# Patient Record
Sex: MALE | Race: BLACK OR AFRICAN AMERICAN
[De-identification: names, ages, dates, MRNs, and addresses within clinical notes are randomized per-mention and may not be internally consistent; named-entity substitution may affect disease eponyms.]

---

## 2017-01-04 ENCOUNTER — HOSPITAL ENCOUNTER (EMERGENCY)
Dept: HOSPITAL 62 - ER | Age: 37
Discharge: HOME | End: 2017-01-04
Payer: SELF-PAY

## 2017-01-04 VITALS — SYSTOLIC BLOOD PRESSURE: 121 MMHG | DIASTOLIC BLOOD PRESSURE: 65 MMHG

## 2017-01-04 DIAGNOSIS — I25.2: ICD-10-CM

## 2017-01-04 DIAGNOSIS — J01.90: Primary | ICD-10-CM

## 2017-01-04 DIAGNOSIS — G43.909: ICD-10-CM

## 2017-01-04 DIAGNOSIS — Z87.891: ICD-10-CM

## 2017-01-04 PROCEDURE — 99283 EMERGENCY DEPT VISIT LOW MDM: CPT

## 2017-01-04 NOTE — ER DOCUMENT REPORT
ED General





- General


Chief Complaint: Headache


Stated Complaint: HEADACHE,BUMP ON RIGHT CORNEA


Time seen by provider: 15:06


Mode of Arrival: Ambulatory


Information source: Patient


Notes: 


This is a 36-year-old man with a history of migraines who presents to the 

emergency room with sinus congestion, postnasal drip, subjective chills, and a 

right-sided headache consistent with his migraines.  Patient states his been 

using Benadryl and intake and Norco without significant relief.


TRAVEL OUTSIDE OF THE [place] IN LAST 30 DAYS: No





- HPI


Onset: Last week


Onset/Duration: Gradual


Quality of pain: Dull


Severity: Moderate


Pain Level: 2


Associated symptoms: Nonproductive cough, Sinus pain/drainage.  denies: Chills, 

Fever, Nausea, Vomiting, Shortness of breath


Exacerbated by: Denies


Relieved by: Denies


Similar symptoms previously: Yes


Recently seen / treated by doctor: Yes





- Related Data


Allergies/Adverse Reactions: 


 





tramadol [Tramadol] Allergy (Verified 01/04/17 12:34)


 











Past Medical History





- General


Information source: Patient





- Social History


Smoking Status: Former Smoker


Chew tobacco use (# tins/day): No


Frequency of alcohol use: None


Drug Abuse: None


Lives with: Family


Family History: Reviewed & Not Pertinent


Patient has suicidal ideation: No


Patient has homicidal ideation: No





- Past Medical History


Cardiac Medical History: Reports: Hx Heart Attack - mini- PT REPORTS HE WAS 

TOLD HE HAD A HEART ATTACK IN 06-07 PT BROUGHT TO [facility].


Neurological Medical History: Reports: Hx Migraine


Endocrine Medical History: Denies: Hx Diabetes Mellitus Type 1, Hx Diabetes 

Mellitus Type 2


Musculoskeltal Medical History: Reports Hx Arthritis


Surgical Hx: Negative





- Immunizations


Immunizations up to date: No


Hx Diphtheria, Pertussis, Tetanus Vaccination: Yes - Tetanus is up to date





Review of Systems





- Review of Systems


Constitutional: denies: Chills, Fever


EENT: See HPI


Cardiovascular: No symptoms reported


Respiratory: No symptoms reported


Gastrointestinal: No symptoms reported


Genitourinary: No symptoms reported


Male Genitourinary: No symptoms reported


Musculoskeletal: No symptoms reported


Skin: No symptoms reported


Hematologic/Lymphatic: No symptoms reported


Neurological/Psychological: See HPI





Physical Exam





- Vital signs


Vitals: 


 











Temp Pulse Resp BP Pulse Ox


 


 98.3 F   76   16   136/68 H  97 


 


 01/04/17 12:36  01/04/17 12:36  01/04/17 12:36  01/04/17 12:36  01/04/17 12:36











Notes: 


Physical exam:


 


GENERAL: 36-year-old man, alert and oriented 3, no acute distress


HEAD: Atraumatic, normocephalic.


EYES: Pupils equal round and reactive to light, extraocular movements intact, 

sclera anicteric, conjunctiva are normal.  No lesions appreciated.


ENT: TMs normal, nares congested, oropharynx reveals posterior pharynx 

discharge.  Moist mucous membranes.  Maxillary sinus tenderness to palpation.


NECK: Normal range of motion, supple without lymphadenopathy or JVD.


LUNGS: Breath sounds clear to auscultation bilaterally and equal.  No wheezes 

rales or rhonchi.


HEART: Regular rate and rhythm without murmurs, rubs or gallops.


ABDOMEN: Soft, nontender, normoactive bowel sounds.  No guarding, no rebound.  

No masses appreciated.


EXTREMITIES: Normal range of motion, no pitting or edema.  No clubbing or 

cyanosis.


NEUROLOGICAL: Cranial nerves II through XII grossly intact.  Motor 5 over 5, 

sensory grossly intact, cerebellar intact, Normal speech, normal gait, neck 

supple, no meningismus.


PSYCH: Normal mood, normal affect.


SKIN: Warm, Dry, normal turgor, no rashes or lesions noted.





Course





- Vital Signs


Vital signs: 


 











Temp Pulse Resp BP Pulse Ox


 


 98.3 F   76   16   136/68 H  97 


 


 01/04/17 12:36  01/04/17 12:36  01/04/17 12:36  01/04/17 12:36  01/04/17 12:36














Discharge





- Discharge


Clinical Impression: 


 acute sinusitis, migraine headache





Condition: Stable


Disposition: HOME, SELF-CARE


Instructions:  Oral Narcotic Medication ([facility]), Sinusitis ([facility]), Migraine 

Headache ([facility])


Additional Instructions: 


Recommendations:





Rest, drink plenty of fluids.


Take the antibiotics as prescribed.


Take the Percocet as needed.


Also take Excedrin migraine (take the preparation that has only caffeine and 

aspirin).


Avoid Benadryl: This may dry you out too much and can result in sinus blockages.


Use "simply saline"nasal spray twice daily in both nostrils: This will keep the 

nasal passages moist so that they can drain so that the sinuses can empty and he

'll.


Follow-up with a migraine specialist: I left the number for Dr Greenberg who is a 

neurologist.





Prescriptions: 


Amox Tr/Potassium Clavulanate [Augmentin 875-125 Tablet] 1 tab PO BID #20 tablet


Oxycodone HCl/Acetaminophen [Percocet 5-325 mg Tablet] 1 - 2 tab PO ASDIR PRN #

25 tablet


 PRN Reason: 


Referrals: 


JANAE GREENBERG MD [ACTIVE STAFF] - Follow up as needed (Call tomorrow for the 

next available appointment: It may take a while to get into the office.)

## 2017-01-04 NOTE — ER DOCUMENT REPORT
ED Medical Screen (RME)





- General


Chief Complaint: Headache


Stated Complaint: HEADACHE,BUMP ON RIGHT CORNEA


Notes: 


35 yo male c/o headache, right sided.  + sinus pressure, nose bleeds since last 

week.  pt was seen in ED last week for same, treated with pain med, 

decongestant and nasal spray.  ringing in right ear.  low grade fever.  + pain 

into right neck. 


TRAVEL OUTSIDE OF THE [place] IN LAST 30 DAYS: No





- Related Data


Allergies/Adverse Reactions: 


 





tramadol [Tramadol] Allergy (Verified 01/04/17 12:34)


 











Past Medical History





- Social History


Chew tobacco use (# tins/day): No


Frequency of alcohol use: None


Drug Abuse: None





- Past Medical History


Cardiac Medical History: Reports: Hx Heart Attack - mini- PT REPORTS HE WAS 

TOLD HE HAD A HEART ATTACK IN 06-07 PT BROUGHT TO [facility].


Neurological Medical History: Reports: Hx Migraine


Endocrine Medical History: Denies: Hx Diabetes Mellitus Type 1, Hx Diabetes 

Mellitus Type 2


Musculoskeltal Medical History: Reports Hx Arthritis





- Immunizations


Immunizations up to date: No


Hx Diphtheria, Pertussis, Tetanus Vaccination: Yes - Tetanus is up to date





Physical Exam





- Vital signs


Vitals: 





 











Temp Pulse Resp BP Pulse Ox


 


 98.3 F   76   16   136/68 H  97 


 


 01/04/17 12:36  01/04/17 12:36  01/04/17 12:36  01/04/17 12:36  01/04/17 12:36














Course





- Vital Signs


Vital signs: 





 











Temp Pulse Resp BP Pulse Ox


 


 98.3 F   76   16   136/68 H  97 


 


 01/04/17 12:36  01/04/17 12:36  01/04/17 12:36  01/04/17 12:36  01/04/17 12:36

## 2017-09-26 ENCOUNTER — HOSPITAL ENCOUNTER (EMERGENCY)
Dept: HOSPITAL 62 - ER | Age: 37
Discharge: HOME | End: 2017-09-26
Payer: SELF-PAY

## 2017-09-26 VITALS — DIASTOLIC BLOOD PRESSURE: 73 MMHG | SYSTOLIC BLOOD PRESSURE: 118 MMHG

## 2017-09-26 DIAGNOSIS — R05: ICD-10-CM

## 2017-09-26 DIAGNOSIS — K04.7: Primary | ICD-10-CM

## 2017-09-26 DIAGNOSIS — Z88.5: ICD-10-CM

## 2017-09-26 DIAGNOSIS — Z59.9: ICD-10-CM

## 2017-09-26 DIAGNOSIS — F17.200: ICD-10-CM

## 2017-09-26 DIAGNOSIS — R09.81: ICD-10-CM

## 2017-09-26 DIAGNOSIS — K02.9: ICD-10-CM

## 2017-09-26 PROCEDURE — 99283 EMERGENCY DEPT VISIT LOW MDM: CPT

## 2017-09-26 SDOH — ECONOMIC STABILITY - INCOME SECURITY: PROBLEM RELATED TO HOUSING AND ECONOMIC CIRCUMSTANCES, UNSPECIFIED: Z59.9

## 2017-09-26 NOTE — ER DOCUMENT REPORT
ED General





- General


Chief Complaint: Abscess


Stated Complaint: CONGESTION AND ABSCESS


Time Seen by Provider: 09/26/17 18:06


Notes: 


The patient is a 37-year-old male who presents with several weeks of right 

lower molar dental pain and he thinks a dental abscess is forming.  He is also 

having a dry cough and nasal congestion.  He tried to see a dentist, but did 

not have the $200 to see the dentist.  Patient denies difficulty swallowing, 

fevers, shortness of breath, chest pain or recent travel.


TRAVEL OUTSIDE OF THE [place] IN LAST 30 DAYS: No





- Related Data


Allergies/Adverse Reactions: 


 





tramadol [Tramadol] Allergy (Verified 01/04/17 12:34)


 











Past Medical History





- General


Information source: Patient





- Social History


Smoking Status: Current Every Day Smoker


Chew tobacco use (# tins/day): No


Frequency of alcohol use: Rare


Drug Abuse: None


Family History: Reviewed & Not Pertinent


Patient has suicidal ideation: No


Patient has homicidal ideation: No





- Past Medical History


Cardiac Medical History: Reports: Hx Heart Attack - mini- PT REPORTS HE WAS 

TOLD HE HAD A HEART ATTACK IN 06-07 PT BROUGHT TO [facility].


Neurological Medical History: Reports: Hx Migraine


Endocrine Medical History: Denies: Hx Diabetes Mellitus Type 1, Hx Diabetes 

Mellitus Type 2


Renal/ Medical History: Denies: Hx Peritoneal Dialysis


Musculoskeltal Medical History: Reports Hx Arthritis


Surgical Hx: Negative





- Immunizations


Immunizations up to date: No


Hx Diphtheria, Pertussis, Tetanus Vaccination: Yes - Tetanus is up to date





Review of Systems





- Review of Systems


Notes: 


REVIEW OF SYSTEMS:


CONSTITUTIONAL: -fevers, -chills


EENT: -eye pain, -difficulty swallowing, +nasal congestion, +dental pain


CARDIOVASCULAR:-chest pain, -syncope.


RESPIRATORY: +cough, -SOB


GASTROINTESTINAL:  -abdominal pain, - nausea, -vomiting, -diarrhea


GENITOURINARY: -dysuria, -hematuria


MUSCULOSKELETAL: -back pain, -neck pain


SKIN: -rash or skin lesions.


HEMATOLOGIC: -easy bruising or bleeding.


LYMPHATIC: -swollen, enlarged glands.


NEUROLOGICAL: -altered mental status or loss of consciousness, -headache, -

neurologic symptoms


PSYCHIATRIC: -anxiety, -depression.


ALL OTHER SYSTEMS REVIEWED AND NEGATIVE.





Physical Exam





- Vital signs


Vitals: 


 











Temp Pulse Resp BP Pulse Ox


 


 98 F   59 L  20   118/73   97 


 


 09/26/17 15:59  09/26/17 15:59  09/26/17 15:59  09/26/17 15:59  09/26/17 15:59














- Notes


Notes: 


PHYSICAL EXAMINATION:





GENERAL: Well-appearing, well-nourished and in no acute distress.





HEAD: Atraumatic, normocephalic.





EYES: Pupils equal round and reactive to light, extraocular movements intact, 

sclera anicteric, conjunctiva are normal.





ENT: nares patent, oropharynx clear without exudates.  Moist mucous membranes. 

Right lower 2nd molar with mild gum swelling and cavity. Nasal congestion. 





NECK: Normal range of motion, supple without lymphadenopathy





LUNGS: Breath sounds clear to auscultation bilaterally and equal.  No wheezes 

rales or rhonchi.





HEART: Regular rate and rhythm without murmurs





ABDOMEN: Soft, nontender, normoactive bowel sounds.  No guarding, no rebound.  

No masses appreciated.





EXTREMITIES: Normal range of motion, no pitting or edema.  No cyanosis.





NEUROLOGICAL: Cranial nerves grossly intact.  Normal speech, normal gait.  

Normal sensory and motor exams.





PSYCH: Normal mood, normal affect.





SKIN: Warm, Dry, normal turgor, no rashes or lesions noted.





Course





- Re-evaluation


Re-evalutation: 


Patient appears well and is in no distress.  Patient started on penicillin for 

dental abscess and instructions to follow-up with the dentist.





- Vital Signs


Vital signs: 


 











Temp Pulse Resp BP Pulse Ox


 


 98 F   59 L  20   118/73   97 


 


 09/26/17 15:59  09/26/17 15:59  09/26/17 15:59  09/26/17 15:59  09/26/17 15:59














Discharge





- Discharge


Clinical Impression: 


 Dental abscess





URI (upper respiratory infection)


Qualifiers:


 URI type: unspecified URI Qualified Code(s): J06.9 - Acute upper respiratory 

infection, unspecified





Condition: Stable


Disposition: HOME, SELF-CARE


Additional Instructions: 


Take the antibiotics as prescribed, Motrin for pain and apply the lidocaine 

jelly to help with any pain.  You must follow-up with the dentist.  Stop 

smoking.





TOOTHACHE:





     Your pain is due to dental decay.  The tooth must be repaired in order for 

you to feel better.  You will, therefore, be referred to a dentist.  We do not 

have dentists on the staff at [facility] beds at the [facility].


     Severe swelling or drainage around a tooth usually means a dental abscess.

  This also requires evaluation and treatment by the dentist, but antibiotics 

may be prescribed while awaiting dental treatment.


     You should be rechecked immediately if you develop major swelling of the 

face, increasing pain, a lump in the jaw or gums, headache, difficulty 

swallowing, or fever.








PENICILLIN V K:


     You have been given a prescription for Penicillin VK.  Your physician has 

determined that this is the best antibiotic for your condition.


     Pen VK can be taken with meals, however more of the antibiotic gets into 

the bloodstream if it's taken on an empty stomach.


     Penicillin usually has no side effects.  However, allergy to penicillins 

is common.  If you have had an allergic reaction to any drug of the penicillin 

family, you should never take any other penicillin.  Notify your doctor at once 

if you develop hives, itching, swelling, faintness, or shortness of breath.








FOLLOW-UP CARE:


     You have been referred for follow-up care to the dentists listed below.  

Call the dentists office for an appointment as you were instructed or within 

the next two days.  If you experience worsening or a significant change in your 

symptoms, notify the physician immediately or return to the Emergency 

Department at any time for re-evaluation.





[facility]


[address] [place], [place]


[phone]


Friday mornings, by appointment





[facility]


[address] [place] [address] [phone]





[facility]


[address]


[phone]





[facility]


[address] [facility]


[phone]





[facility]


[address]


[phone]


[email or web address]





[facility]


[address] [place]  [address] [phone]


Monday-Thursdays  8:00am to 5:00 pm


Will see patients from other [facility].


Charges based on income and family size and


accepts Medicare, Medicaid, and Insurances


Will pull molars





[facility]


Student Clinics


[facility] [place] [address] [phone]


Hours of Operation


   8:00 am - 4:30 pm weekdays





The following dental offices accept Medicaid:





   [facility]   [phone]


   Dr. Coates         [phone]


   Dr. Osei         [phone]


   Dr. Huang         [phone]


   Dr. Alcaraz         [phone]


   [facility]


      oral surgery      [phone]


   Dr. Sarkar ([place])      [phone]


   Dr. Pennington ([place])   [phone]


   [facility]      [phone]


   [facility] ([place])   [phone]


   Dr. Up ([place])      [phone]


   [facility]      [phone]


   [facility]   [phone]


   [facility]   [phone]


   Dr. Stern ([place])      [phone]


   [facility] and 


      ([place])      [phone]





   Medicaid Care Line      [phone]





UPPER RESPIRATORY ILLNESS:





     You have a viral infection of the respiratory passages -- a "cold."  This 

common infection causes nasal congestion, drainage, and often sore throat and 

cough.  It is highly contagious.  The disease usually lasts about 10 to 14 days.


     There is no "cure" for the viral infection -- it must run its course.  If 

there is a complication, such as bacterial infection in the nose, sinuses, 

middle ear, or bronchial tubes, antibiotics may be required.  The antibiotics 

won't affect the virus.


     Drink plenty of fluids.  A humidifier may help.  An expectorant medication 

or decongestant may make you more comfortable.  Use acetaminophen or ibuprofen 

for fever or aches.


     See the doctor if fever persists over two days, if there is any 

significant worsening of your symptoms, or if you simply fail to improve as 

expected.








USE OF ACETAMINOPHEN (Tylenol):


     Acetaminophen may be taken for pain relief or fever control. It's much 

safer than aspirin, offering a wider range of "safe" dosages.  It is safe 

during pregnancy.  Some brand names are Tylenol, Panadol, Datril, Anacin 3, 

Tempra, and Liquiprin. Acetaminophen can be repeated every four hours.  The 

following are maximum recommended dosages:


>89 pounds or adults          650 mg to 900 mg


Acetaminophen can be repeated every four hours.  Maximum dose not to exceed 

4000 mg a day.








SMOKING:


     If you smoke, you should stop smoking.  The tar and chemicals in cigarette 

smoke are harmful.  Smoking has been shown to cause:


          emphysema


          chronic bronchitis


          lung cancer


          mouth and throat cancer


          stomach and pancreas cancer


          premature aging


          birth defects


     In addition, smoking increases ear and lung infections in children of 

smokers.








FOLLOW-UP CARE:


If you have been referred to a physician for follow-up care, call the physician

s office for an appointment as you were instructed or within the next two days.

  If you experience worsening or a significant change in your symptoms, notify 

the physician immediately or return to the Emergency Department at any time for 

re-evaluation.





Prescriptions: 


Penicillin V Potassium [Penicillin Vk 500 mg Tablet] 500 mg PO BID #20 tablet


Penicillin V Potassium [Penicillin Vk 500 mg Tablet] 500 mg PO BID #20 tablet

## 2019-11-09 ENCOUNTER — HOSPITAL ENCOUNTER (EMERGENCY)
Dept: HOSPITAL 62 - ER | Age: 39
Discharge: HOME | End: 2019-11-09
Payer: SELF-PAY

## 2019-11-09 VITALS — SYSTOLIC BLOOD PRESSURE: 124 MMHG | DIASTOLIC BLOOD PRESSURE: 81 MMHG

## 2019-11-09 DIAGNOSIS — S61.211A: Primary | ICD-10-CM

## 2019-11-09 DIAGNOSIS — F17.200: ICD-10-CM
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
## 2019-11-09 DIAGNOSIS — Z23: ICD-10-CM

## 2019-11-09 PROCEDURE — 90715 TDAP VACCINE 7 YRS/> IM: CPT

## 2019-11-09 PROCEDURE — 12001 RPR S/N/AX/GEN/TRNK 2.5CM/<: CPT

## 2019-11-09 NOTE — ER DOCUMENT REPORT
ED General





- General


Chief Complaint: Laceration


Stated Complaint: FINGER LACERATION


Time Seen by Provider: 11/09/19 19:22


Primary Care Provider: 


[facility] [Provider Group] - Follow up in 1 week


Mode of Arrival: Ambulatory


TRAVEL OUTSIDE OF THE [place] IN LAST 30 DAYS: No





- HPI


Notes: 





39-year-old male to the emergency department with complaints of a laceration to 

his left index finger that occurred this afternoon.  Patient states that he was 

working with a partner at work when he got cut by a carpet blade.  He states 

that he is not up-to-date on his immunizations.  He states he is ambidextrous.  

He denies any weakness in the finger or any numbness or tingling.  He does state

that his pain does radiate up and to his hand and arm.





- Related Data


Allergies/Adverse Reactions: 


                                        





tramadol [Tramadol] Allergy (Verified 01/04/17 12:34)


   











Past Medical History





- General


Information source: Patient





- Social History


Smoking Status: Current Every Day Smoker


Chew tobacco use (# tins/day): No


Frequency of alcohol use: quit


Drug Abuse: None


Family History: Reviewed & Not Pertinent


Patient has suicidal ideation: No


Patient has homicidal ideation: No





- Past Medical History


Cardiac Medical History: Reports: Hx Heart Attack - mini- PT REPORTS HE WAS TOLD

 HE HAD A HEART ATTACK IN 06-07 PT BROUGHT TO [facility] Hx Hypertension


Neurological Medical History: Reports: Hx Migraine


Endocrine Medical History: Denies: Hx Diabetes Mellitus Type 1, Hx Diabetes 

Mellitus Type 2


Renal/ Medical History: Denies: Hx Peritoneal Dialysis


Musculoskeletal Medical History: Reports Hx Arthritis





- Immunizations


Immunizations up to date: No


Hx Diphtheria, Pertussis, Tetanus Vaccination: Yes - Tetanus is up to date





Review of Systems





- Review of Systems


Constitutional: denies: Chills, Fever


EENT: No symptoms reported


Cardiovascular: No symptoms reported


Respiratory: No symptoms reported.  denies: Cough, Short of breath


Gastrointestinal: denies: Abdominal pain, Diarrhea, Nausea, Vomiting


Genitourinary: No symptoms reported


Musculoskeletal: See HPI, Joint pain - Finger pain


Skin: Other - Laceration to the left index finger


Neurological/Psychological: No symptoms reported


-: Yes All other systems reviewed and negative





Physical Exam





- Vital signs


Vitals: 


                                        











Temp Pulse Resp BP Pulse Ox


 


 99.0 F   64   16   116/74   100 


 


 11/09/19 19:17  11/09/19 19:17  11/09/19 19:17  11/09/19 19:17  11/09/19 19:17











Interpretation: Normal





- General


General appearance: Appears well, Alert





- HEENT


Head: Normocephalic, Atraumatic


Eyes: Normal


Pupils: PERRL





- Respiratory


Respiratory status: No respiratory distress


Chest status: Nontender


Breath sounds: Normal


Chest palpation: Normal





- Cardiovascular


Rhythm: Regular


Heart sounds: Normal auscultation


Murmur: No





- Abdominal


Inspection: Normal


Distension: No distension


Bowel sounds: Normal


Tenderness: Nontender


Organomegaly: No organomegaly





- Skin


Skin Temperature: Warm


Skin Moisture: Dry


Skin irregularity: Laceration - There is a 2.5 cm laceration to the left index 

finger along the radial edge.  Patient has 5 out of 5 strength against 

resistance in his flexion of the left index finger at the flexor digitorum 

profundus and digitorum superficialis as well as the extensor digitorum.  He has

 5 out of 5 strength in all other fingers against resistance as well.  Cap r

efill is less than 2 seconds.  Adduction and abduction are intact in the fingers

 against resistance with 5 out of 5 strength as well.  Radial pulses are intact 

and equal.





Course





- Re-evaluation


Re-evalutation: 





11/09/19 21:14


Impression: Left index finger laceration.  Patient tolerated repair well.  We 

will send home with Keflex because his hands were little bit dirty during the 

incident.  Also send home with the etodolac.  Patient agrees with the plan.





- Vital Signs


Vital signs: 


                                        











Temp Pulse Resp BP Pulse Ox


 


 99.0 F   64   16   116/74   100 


 


 11/09/19 19:17  11/09/19 19:17  11/09/19 19:17  11/09/19 19:17  11/09/19 19:17














Procedures





- Immobilization


  ** Left Finger 2nd digit


Time completed: 21:15


Pre-Proc Neuro Vasc Exam: Normal


Immobilizer type: Finger splint (Static)


Performed by: RN - Fingers


Post-Proc Neuro Vasc Exam: Normal


Alignment checked and good: Yes





- Laceration/Wound Repair


  ** Left Finger 2nd digit


Time completed: 21:00


Wound length (cm): 2.5


Wound's Depth, Shape: Superficial


Laceration pre-procedure: Sterile PPE donned, Betadine prep applied, Sterile 

drapes applied, Shur-Clens applied


Anesthetic type: 1% Lidocaine


Volume Anesthetic (mLs): 5


Wound explored: Clean, No foreign body removed


Irrigated w/ Saline (mLs): 50


Wound Debrided: Minimal


Wound Repaired With: Sutures


Suture Size/Type: 5:0


Number of Sutures: 5


Post-procedure wound care: Sterile dressing applied, Splint applied


Post-procedure NV exam normal: Yes


Complications: No





Discharge





- Discharge


Clinical Impression: 


Laceration of left index finger


Qualifiers:


 Encounter type: initial encounter Damage to nail status: without damage Foreign

 body presence: without foreign body Qualified Code(s): S61.211A - Laceration 

without foreign body of left index finger without damage to nail, initial 

encounter





Condition: Stable


Disposition: HOME, SELF-CARE


Instructions:  Laceration Care ([facility])


Additional Instructions: 


SUTURE REMOVAL IN 7 DAYS.  WEAR SPLINT.   COMPLETE ANTIBIOTICS. 


Prescriptions: 


Etodolac 200 mg PO BID #16 capsule


Cephalexin Monohydrate [Keflex 500 mg Capsule] 500 mg PO Q6H 7 Days #28 capsule


Referrals: 


[facility] [Provider Group] - Follow up in 1 week

## 2019-11-09 NOTE — ER DOCUMENT REPORT
ED Medical Screen (RME)





- General


Chief Complaint: Laceration


Stated Complaint: FINGER LACERATION


Time Seen by Provider: 11/09/19 19:22


Mode of Arrival: Ambulatory


Information source: Patient


Notes: 





Patient was at work and finger got cut by a carpet blade this afternoon.  

Patient with laceration to the radial aspect of the left second finger.





I have greeted and performed a rapid initial assessment of this patient.  A 

comprehensive ED assessment and evaluation of the patient, analysis of test 

results and completion of the medical decision making process will be conducted 

by additional ED providers.


TRAVEL OUTSIDE OF THE [place] IN LAST 30 DAYS: No





- Related Data


Allergies/Adverse Reactions: 


                                        





tramadol [Tramadol] Allergy (Verified 01/04/17 12:34)


   











Past Medical History





- Social History


Chew tobacco use (# tins/day): No


Frequency of alcohol use: quit


Drug Abuse: None





- Past Medical History


Cardiac Medical History: Reports: Hx Heart Attack - mini- PT REPORTS HE WAS TOLD

 HE HAD A HEART ATTACK IN 06-07 PT BROUGHT TO [facility] Hx Hypertension


Neurological Medical History: Reports: Hx Migraine


Endocrine Medical History: Denies: Hx Diabetes Mellitus Type 1, Hx Diabetes 

Mellitus Type 2


Renal/ Medical History: Denies: Hx Peritoneal Dialysis


Musculoskeltal Medical History: Reports Hx Arthritis





- Immunizations


Immunizations up to date: No


Hx Diphtheria, Pertussis, Tetanus Vaccination: Yes - Tetanus is up to date





Physical Exam





- Vital signs


Vitals: 





                                        











Temp Pulse Resp BP Pulse Ox


 


 99.0 F   64   16   116/74   100 


 


 11/09/19 19:17  11/09/19 19:17  11/09/19 19:17  11/09/19 19:17  11/09/19 19:17














- General


General appearance: Appears well, Alert


Notes: 





Laceration to the radial aspect of her left second finger





Course





- Vital Signs


Vital signs: 





                                        











Temp Pulse Resp BP Pulse Ox


 


 99.0 F   64   16   116/74   100 


 


 11/09/19 19:17  11/09/19 19:17  11/09/19 19:17  11/09/19 19:17  11/09/19 19:17